# Patient Record
Sex: MALE | URBAN - METROPOLITAN AREA
[De-identification: names, ages, dates, MRNs, and addresses within clinical notes are randomized per-mention and may not be internally consistent; named-entity substitution may affect disease eponyms.]

---

## 2020-06-01 PROBLEM — Z00.00 ENCOUNTER FOR PREVENTIVE HEALTH EXAMINATION: Status: ACTIVE | Noted: 2020-06-01

## 2020-06-02 ENCOUNTER — OUTPATIENT (OUTPATIENT)
Dept: OUTPATIENT SERVICES | Facility: HOSPITAL | Age: 80
LOS: 1 days | End: 2020-06-02
Payer: MEDICAID

## 2020-06-02 ENCOUNTER — APPOINTMENT (OUTPATIENT)
Dept: PULMONOLOGY | Facility: CLINIC | Age: 80
End: 2020-06-02
Payer: MEDICAID

## 2020-06-02 ENCOUNTER — APPOINTMENT (OUTPATIENT)
Dept: RADIOLOGY | Facility: CLINIC | Age: 80
End: 2020-06-02

## 2020-06-02 VITALS
OXYGEN SATURATION: 90 % | TEMPERATURE: 98.4 F | HEART RATE: 85 BPM | WEIGHT: 115 LBS | DIASTOLIC BLOOD PRESSURE: 79 MMHG | SYSTOLIC BLOOD PRESSURE: 159 MMHG

## 2020-06-02 DIAGNOSIS — R09.02 HYPOXEMIA: ICD-10-CM

## 2020-06-02 DIAGNOSIS — N40.0 BENIGN PROSTATIC HYPERPLASIA WITHOUT LOWER URINARY TRACT SYMPMS: ICD-10-CM

## 2020-06-02 PROCEDURE — 99204 OFFICE O/P NEW MOD 45 MIN: CPT

## 2020-06-02 PROCEDURE — 71046 X-RAY EXAM CHEST 2 VIEWS: CPT | Mod: 26

## 2020-06-02 RX ORDER — ALBUTEROL SULFATE 90 UG/1
INHALANT RESPIRATORY (INHALATION)
Refills: 0 | Status: ACTIVE | COMMUNITY

## 2020-06-02 NOTE — PHYSICAL EXAM
[No Acute Distress] : no acute distress [Normal Appearance] : normal appearance [Normal Oropharynx] : normal oropharynx [Normal Rate/Rhythm] : normal rate/rhythm [No Neck Mass] : no neck mass [Normal S1, S2] : normal s1, s2 [No Murmurs] : no murmurs [No Abnormalities] : no abnormalities [Benign] : benign [Normal Gait] : normal gait [No Clubbing] : no clubbing [No Cyanosis] : no cyanosis [No Edema] : no edema [FROM] : FROM [No Focal Deficits] : no focal deficits [Normal Color/ Pigmentation] : normal color/ pigmentation [Oriented x3] : oriented x3 [Normal Affect] : normal affect [TextBox_68] : decreased breath sounds bilaterally; somewhat winded when speaking full sentences but not in distress

## 2020-06-02 NOTE — ASSESSMENT
[FreeTextEntry1] : 80yo man with hx of HTN here for evaluation of SOB/JORGE. His symptoms seem acute over the past 3 months and he has been hospitalized 3 times and the etiology of symptoms is unclear; he denies ever being given a diagnosis for his respiratory complaints, and denies any past history of lung disease. Records that he brought in are not useful. He has ambulatory hypoxemia: desaturated to 87% without oxygen; at rest he was above 94%. At this point I need more information as he definitely has some sort of respiratory issue. I have asked him to get a CXR. Also ordered blood-work, including COVID19 IgG serology. Will also request records from both [place] and [facility] for more information. Depending on serology and CXR results will most likely need further workup. I cannot clear him for travel back to [facility] because I do not know the etiology of his hypoxemia.

## 2020-06-02 NOTE — HISTORY OF PRESENT ILLNESS
[Never] : never [Intermittent] : Intermittent [NC] : Nasal Cannula [TextBox_4] : 78 yo man with HTN, BPH here for post hospitalization evaluation. He is Turkish speaking.  services used. \par \par Over the past 3 months he has noticed an increase in SOB/JORGE, which has led to 3 different hospitalizations. He is not clear what he was hospitalized for; denies any kind of imaging performed. He is in the office with an oxygen tank. He endorses SOB and JORGE on exertion which are improved when he is at rest. Denies any other symptoms including cough, fever, myalgias. He would like to fly back to [facility] and would like clearance. He brought paperwork with him that lists his discharge medication list and mention of possible COVID infection but no clear evidence that he was hospitalized for this. He denies having ever been told he had COVID infection. He also denies ever being told what the etiology of his lung issues are. He is a never smoker. Denies: fever, cough, rash, LE edema. Did note that he was exposed to some "dirty" water but could not explain that further; denied diarrhea or other GI symptoms. \par \par \par [facility], [place], 4/28 to 5/9, MRN [id number]\par [facility], 5/17 to 5/27, MRN [id number] [FreeTextEntry1] : 2 [FreeTextEntry3] : during ambulation

## 2020-06-04 LAB
ALBUMIN SERPL ELPH-MCNC: 4.1 G/DL
ALP BLD-CCNC: 79 U/L
ALT SERPL-CCNC: 20 U/L
ANION GAP SERPL CALC-SCNC: 14 MMOL/L
AST SERPL-CCNC: 20 U/L
BASOPHILS # BLD AUTO: 0.03 K/UL
BASOPHILS NFR BLD AUTO: 0.3 %
BILIRUB SERPL-MCNC: 0.3 MG/DL
BUN SERPL-MCNC: 13 MG/DL
CALCIUM SERPL-MCNC: 9.7 MG/DL
CHLORIDE SERPL-SCNC: 104 MMOL/L
CO2 SERPL-SCNC: 24 MMOL/L
CREAT SERPL-MCNC: 0.78 MG/DL
CRP SERPL-MCNC: 1.05 MG/DL
DEPRECATED D DIMER PPP IA-ACNC: 398 NG/ML DDU
EOSINOPHIL # BLD AUTO: 0.23 K/UL
EOSINOPHIL NFR BLD AUTO: 2.7 %
FERRITIN SERPL-MCNC: 347 NG/ML
GLUCOSE SERPL-MCNC: 100 MG/DL
HCT VFR BLD CALC: 38.6 %
HGB BLD-MCNC: 11.8 G/DL
IMM GRANULOCYTES NFR BLD AUTO: 0.6 %
LYMPHOCYTES # BLD AUTO: 1.44 K/UL
LYMPHOCYTES NFR BLD AUTO: 16.8 %
MAN DIFF?: NORMAL
MCHC RBC-ENTMCNC: 30.6 GM/DL
MCHC RBC-ENTMCNC: 30.6 PG
MCV RBC AUTO: 100 FL
MONOCYTES # BLD AUTO: 0.44 K/UL
MONOCYTES NFR BLD AUTO: 5.1 %
NEUTROPHILS # BLD AUTO: 6.4 K/UL
NEUTROPHILS NFR BLD AUTO: 74.5 %
PLATELET # BLD AUTO: 270 K/UL
POTASSIUM SERPL-SCNC: 4.7 MMOL/L
PROCALCITONIN SERPL-MCNC: 0.03 NG/ML
PROT SERPL-MCNC: 7.8 G/DL
RBC # BLD: 3.86 M/UL
RBC # FLD: 14.8 %
SARS-COV-2 IGG SERPL IA-ACNC: 7.55 INDEX
SARS-COV-2 IGG SERPL QL IA: POSITIVE
SODIUM SERPL-SCNC: 142 MMOL/L
WBC # FLD AUTO: 8.59 K/UL

## 2020-06-11 ENCOUNTER — APPOINTMENT (OUTPATIENT)
Dept: CT IMAGING | Facility: CLINIC | Age: 80
End: 2020-06-11
Payer: MEDICAID

## 2020-06-11 ENCOUNTER — OUTPATIENT (OUTPATIENT)
Dept: OUTPATIENT SERVICES | Facility: HOSPITAL | Age: 80
LOS: 1 days | End: 2020-06-11

## 2020-06-11 PROCEDURE — 71250 CT THORAX DX C-: CPT | Mod: 26

## 2020-06-12 ENCOUNTER — APPOINTMENT (OUTPATIENT)
Dept: PULMONOLOGY | Facility: CLINIC | Age: 80
End: 2020-06-12
Payer: MEDICAID

## 2020-06-12 PROCEDURE — 99214 OFFICE O/P EST MOD 30 MIN: CPT | Mod: 95

## 2020-06-12 NOTE — HISTORY OF PRESENT ILLNESS
[Medical Office: ([facility])___] : at the medical office located in  [Home] : at home, [unfilled] , at the time of the visit. [Spouse] : spouse [Verbal consent obtained from patient] : the patient, [unfilled] [TextBox_4] : 78 yo man with HTN, BPH here for post hospitalization evaluation. He is Albanian speaking.  services used. \par \par Over the past 3 months he has noticed an increase in SOB/JORGE, which has led to 3 different hospitalizations. He is not clear what he was hospitalized for; denies any kind of imaging performed. He is in the office with an oxygen tank. He endorses SOB and JORGE on exertion which are improved when he is at rest. Denies any other symptoms including cough, fever, myalgias. He would like to fly back to [facility] and would like clearance. He brought paperwork with him that lists his discharge medication list and mention of possible COVID infection but no clear evidence that he was hospitalized for this. He denies having ever been told he had COVID infection. He also denies ever being told what the etiology of his lung issues are. He is a never smoker. Denies: fever, cough, rash, LE edema. Did note that he was exposed to some "dirty" water but could not explain that further; denied diarrhea or other GI symptoms. \par \par \par [facility], [place], 4/28 to 5/9, MRN [id number]\[facility], 5/17 to 5/27, MRN [id number]\[facility] 6/12/[facility]\par Patient is having the same SOB/JORGE. Had CT chest. Using O2 when ambulating. [Never] : never [Intermittent] : Intermittent [NC] : Nasal Cannula [FreeTextEntry1] : 2 [FreeTextEntry3] : during ambulation

## 2020-06-12 NOTE — PROCEDURE
[FreeTextEntry1] : CT chest from 6/11/2020 reviewed:\par *GGO bilaterally in upper and lower lobes\par *Traction bronchiectasis\par *Small pleural effusion on left\par *N7 node enlarged\par *Incidental finding: calcified aorta; outpouching in descending aorta

## 2020-06-12 NOTE — PHYSICAL EXAM
[Well Nourished] : well nourished [No Acute Distress] : no acute distress [Normal Appearance] : normal appearance [No Deformities] : no deformities [Well Developed] : well developed [TextBox_2] : *****full physical exam could not be performed due to this being a TeleHealth visit

## 2020-06-12 NOTE — ASSESSMENT
[FreeTextEntry1] : 80 yo man with HTN, BPH with COVID19 lung disease. He is about 3 months post initial diagnosis. He is oxygen dependent. His CT chest shows traction bronchiectasis and extensive GGOs; there is no prior CT chest available for review (or was ever done) to determine the progression of disease. While it may be too late in his disease progression I think it is worth to try Prednisone to see if there is improvement in symptoms/imaging. Prednisone, 40mg Qday, sent to pharmacy. Will be on prednisone for at least 4 weeks so will also start Bactrim for PCP ppx. Will need PFTs as well. Of note, there was an incidental finding of an outpouching of the descending aorta that is concerning and requires further imaging; CTA ordered for further evaluation. He was advised to have this testing done as quickly as possible, and auth has been expedited. Mr. Sarabia will follow up with me in 2 weeks or sooner if needed. All questions answered.\par \par

## 2020-06-18 ENCOUNTER — OUTPATIENT (OUTPATIENT)
Dept: OUTPATIENT SERVICES | Facility: HOSPITAL | Age: 80
LOS: 1 days | End: 2020-06-18

## 2020-06-18 ENCOUNTER — APPOINTMENT (OUTPATIENT)
Dept: CT IMAGING | Facility: CLINIC | Age: 80
End: 2020-06-18
Payer: MEDICAID

## 2020-06-18 PROCEDURE — 71275 CT ANGIOGRAPHY CHEST: CPT | Mod: 26

## 2020-06-28 PROBLEM — U07.1 COVID-19 VIRUS INFECTION: Status: ACTIVE | Noted: 2020-06-04

## 2020-06-28 PROBLEM — Z85.46 HISTORY OF MALIGNANT NEOPLASM OF PROSTATE: Status: RESOLVED | Noted: 2020-06-28 | Resolved: 2020-06-28

## 2020-06-28 PROBLEM — Q25.40 AORTIC ANOMALY: Noted: 2020-06-12

## 2020-06-28 PROBLEM — I70.0 AORTIC ATHEROSCLEROSIS: Status: ACTIVE | Noted: 2020-06-28

## 2020-06-29 ENCOUNTER — NON-APPOINTMENT (OUTPATIENT)
Age: 80
End: 2020-06-29

## 2020-06-29 ENCOUNTER — APPOINTMENT (OUTPATIENT)
Dept: HEART AND VASCULAR | Facility: CLINIC | Age: 80
End: 2020-06-29
Payer: MEDICAID

## 2020-06-29 VITALS — SYSTOLIC BLOOD PRESSURE: 153 MMHG | DIASTOLIC BLOOD PRESSURE: 76 MMHG

## 2020-06-29 VITALS
HEIGHT: 61 IN | DIASTOLIC BLOOD PRESSURE: 76 MMHG | HEART RATE: 77 BPM | WEIGHT: 121 LBS | OXYGEN SATURATION: 96 % | TEMPERATURE: 97.4 F | SYSTOLIC BLOOD PRESSURE: 159 MMHG | BODY MASS INDEX: 22.84 KG/M2

## 2020-06-29 DIAGNOSIS — Z85.46 PERSONAL HISTORY OF MALIGNANT NEOPLASM OF PROSTATE: ICD-10-CM

## 2020-06-29 DIAGNOSIS — U07.1 COVID-19: ICD-10-CM

## 2020-06-29 DIAGNOSIS — I10 ESSENTIAL (PRIMARY) HYPERTENSION: ICD-10-CM

## 2020-06-29 DIAGNOSIS — I70.0 ATHEROSCLEROSIS OF AORTA: ICD-10-CM

## 2020-06-29 DIAGNOSIS — Q25.40 CONGENITAL MALFORMATION OF AORTA UNSPECIFIED: ICD-10-CM

## 2020-06-29 PROCEDURE — 99205 OFFICE O/P NEW HI 60 MIN: CPT | Mod: 25

## 2020-06-29 PROCEDURE — 93000 ELECTROCARDIOGRAM COMPLETE: CPT

## 2020-06-29 RX ORDER — TRAZODONE HYDROCHLORIDE 100 MG/1
100 TABLET ORAL
Qty: 30 | Refills: 5 | Status: ACTIVE | COMMUNITY

## 2020-06-29 RX ORDER — LABETALOL HYDROCHLORIDE 100 MG/1
100 TABLET, FILM COATED ORAL
Qty: 180 | Refills: 3 | Status: ACTIVE | COMMUNITY
Start: 2020-06-29 | End: 1900-01-01

## 2020-06-29 RX ORDER — LOSARTAN POTASSIUM 100 MG/1
100 TABLET, FILM COATED ORAL DAILY
Qty: 90 | Refills: 3 | Status: ACTIVE | COMMUNITY

## 2020-06-29 NOTE — HISTORY OF PRESENT ILLNESS
[FreeTextEntry1] : Mr. Sarabia presents for initial evaluation and management of HTN, aortic atherosclerosis, bronchiectasis, COVID-19, prostate cancer s/p XRT, and BPH.  He was diagnosed with COVID-19 in May, 2020 and has had slowly improving JORGE as a result.  He presents secondary to a CTA chest performed on 06/22/20 which revealed atherosclerosis descending aorta, no aortic aneurysm, and extensive ground glass opacities secondary to COVID-19. He lives in [facility] and will be traveling home in July, 2020. He denies chest pain or palpitations and has good exercise capacity. \par \par Upper sorbian  : [id number]

## 2020-06-29 NOTE — REASON FOR VISIT
[Initial Evaluation] : an initial evaluation of [FreeTextEntry1] : HTN, aortic atherosclerosis, bronchiectasis, COVID-19, prostate cancer s/p XRT, and BPH

## 2020-06-29 NOTE — PHYSICAL EXAM
[General Appearance - Well Developed] : well developed [Well Groomed] : well groomed [Normal Appearance] : normal appearance [General Appearance - Well Nourished] : well nourished [General Appearance - In No Acute Distress] : no acute distress [No Deformities] : no deformities [Normal Oral Mucosa] : normal oral mucosa [Normal Conjunctiva] : the conjunctiva exhibited no abnormalities [Eyelids - No Xanthelasma] : the eyelids demonstrated no xanthelasmas [No Oral Cyanosis] : no oral cyanosis [Normal Jugular Venous A Waves Present] : normal jugular venous A waves present [No Oral Pallor] : no oral pallor [No Jugular Venous Jasso A Waves] : no jugular venous jasso A waves [Normal Jugular Venous V Waves Present] : normal jugular venous V waves present [Normal S2] : normal S2 [Normal Rate] : normal [Normal S1] : normal S1 [2+] : left 2+ [No Abnormalities] : the abdominal aorta was not enlarged and no bruit was heard [No Pitting Edema] : no pitting edema present [Respiration, Rhythm And Depth] : normal respiratory rhythm and effort [Auscultation Breath Sounds / Voice Sounds] : lungs were clear to auscultation bilaterally [Exaggerated Use Of Accessory Muscles For Inspiration] : no accessory muscle use [Abdomen Soft] : soft [Abdomen Tenderness] : non-tender [Abdomen Mass (___ Cm)] : no abdominal mass palpated [Nail Clubbing] : no clubbing of the fingernails [Abnormal Walk] : normal gait [Gait - Sufficient For Exercise Testing] : the gait was sufficient for exercise testing [Cyanosis, Localized] : no localized cyanosis [Skin Color & Pigmentation] : normal skin color and pigmentation [Petechial Hemorrhages (___cm)] : no petechial hemorrhages [] : no rash [Skin Lesions] : no skin lesions [No Venous Stasis] : no venous stasis [Oriented To Time, Place, And Person] : oriented to person, place, and time [No Skin Ulcers] : no skin ulcer [No Xanthoma] : no  xanthoma was observed [Mood] : the mood was normal [Affect] : the affect was normal [No Anxiety] : not feeling anxious [Holosystolic] : holosystolic [Medium] : medium pitched [II] : a grade 2 [Harsh] : harsh [S3] : no S3 [S4] : no S4 [Right Carotid Bruit] : no bruit heard over the right carotid [Left Carotid Bruit] : no bruit heard over the left carotid [Right Femoral Bruit] : no bruit heard over the right femoral artery [Left Femoral Bruit] : no bruit heard over the left femoral artery

## 2020-06-29 NOTE — REVIEW OF SYSTEMS
[Negative] : Psychiatric [Dyspnea on exertion] : dyspnea during exertion [Shortness Of Breath] : shortness of breath [Chest Pain] : no chest pain

## 2020-06-29 NOTE — ASSESSMENT
[FreeTextEntry1] : 1. HTN: BP not at ACC/AHA 2017 guideline target\par        - continue losartan 100mg po QD\par        - will add labetalol 100mg po bid (possible adverse effects of new medications discussed) (will screen for bradycardia)\par        - if BP remains above target next visit will up titrate anti-HTN regimen\par        - will send for an echocardiogram to r/o hypertensive heart disease\par \par 2. Aortic atherosclerosis: 06/22/20: CTA chest: atherosclerosis descending aorta, no aortic aneurysm, extensive ground glass opacities secondary to COVID-19. \par         - continue aggressive medical management\par         - will follow with serial imaging\par         - will send for an aorta-iliac sonogram to assess abdominal aorta and mesenteric vessels\par         - will initiate rosuvastatin 20mg po QD (possible adverse effects of new medications discussed)\par \par 3. COVID-19 infection: 05/2020\par          - no signs of active infection\par          - continue conservative management\par \par [facility]. [facility] [place] [facility]par          - there are no cardiac contraindications to air travel at this time\par \par - patient will provide copy of recent lab work from PMD's office for my review.\par \par

## 2020-06-30 ENCOUNTER — APPOINTMENT (OUTPATIENT)
Dept: PULMONOLOGY | Facility: CLINIC | Age: 80
End: 2020-06-30
Payer: MEDICAID

## 2020-06-30 PROCEDURE — 99213 OFFICE O/P EST LOW 20 MIN: CPT | Mod: 95

## 2020-07-01 NOTE — HISTORY OF PRESENT ILLNESS
[Home] : at home, [unfilled] , at the time of the visit. [Medical Office: ([facility])___] : at the medical office located in  [Spouse] : spouse [Verbal consent obtained from patient] : the patient, [unfilled] [Never] : never [NC] : Nasal Cannula [Intermittent] : Intermittent [FreeTextEntry1] : 2 [TextBox_4] : 78 yo man with HTN, BPH here for post hospitalization evaluation. He is Upper sorbian speaking.  services used. \par \par Over the past 3 months he has noticed an increase in SOB/JORGE, which has led to 3 different hospitalizations. He is not clear what he was hospitalized for; denies any kind of imaging performed. He is in the office with an oxygen tank. He endorses SOB and JORGE on exertion which are improved when he is at rest. Denies any other symptoms including cough, fever, myalgias. He would like to fly back to [facility] and would like clearance. He brought paperwork with him that lists his discharge medication list and mention of possible COVID infection but no clear evidence that he was hospitalized for this. He denies having ever been told he had COVID infection. He also denies ever being told what the etiology of his lung issues are. He is a never smoker. Denies: fever, cough, rash, LE edema. Did note that he was exposed to some "dirty" water but could not explain that further; denied diarrhea or other GI symptoms. \par \par \par [facility], 4/28 to 5/9, MRN [id number]\par [facility] [place], 5/17 to 5/27, MRN [id number]\par \par 6/12/2020\par Patient is having the same SOB/JORGE. Had CT chest. Using O2 when ambulating.\par \par 7/1/2020\par Doing well on steroids. Stopped using oxygen for ambulation, as he has been checking his levels and they are consistently above 90. Would like to travel to [facility] in July. [FreeTextEntry3] : during ambulation

## 2020-07-01 NOTE — ASSESSMENT
[FreeTextEntry1] : 80 yo man with HTN, BPH with COVID19 lung disease. His CT chest shows traction bronchiectasis and extensive GGOs; there is no prior CT chest available for review (or was ever done) to determine the progression of disease. Has been on prednisone (+ PCP ppx) since June 12, 2020 and has noticed a significant improvement in oxygenation as well as functional ability. Has stopped O2 use since starting Prednisone. We will continue with 40mg of Prednisone for a total of 4 weeks (with PCP ppx) and we will start to taper by 5mg each week to monitor his response. Will need repeat imaging at 6 to 8 weeks (end of July/beginning of August). Mr. Sarabia would like to move back to [facility]; while I cannot stop him I have advised him he will need to set up adequate follow up care there as we do not know the duration of steroids he will need. Mr. Sarabia has verbalized understanding regarding this and will discuss this further with his family. If he decides to leave I would like to repeat imaging sooner. Follow up in 2 weeks.

## 2020-07-06 RX ORDER — ROSUVASTATIN CALCIUM 20 MG/1
20 TABLET, FILM COATED ORAL DAILY
Qty: 90 | Refills: 3 | Status: ACTIVE | COMMUNITY
Start: 2020-06-29

## 2020-07-13 ENCOUNTER — LABORATORY RESULT (OUTPATIENT)
Age: 80
End: 2020-07-13

## 2020-07-15 ENCOUNTER — APPOINTMENT (OUTPATIENT)
Dept: HEART AND VASCULAR | Facility: CLINIC | Age: 80
End: 2020-07-15
Payer: MEDICAID

## 2020-07-15 ENCOUNTER — APPOINTMENT (OUTPATIENT)
Dept: PULMONOLOGY | Facility: CLINIC | Age: 80
End: 2020-07-15
Payer: MEDICAID

## 2020-07-15 VITALS — TEMPERATURE: 97.8 F

## 2020-07-15 PROCEDURE — 93306 TTE W/DOPPLER COMPLETE: CPT

## 2020-07-15 PROCEDURE — 94729 DIFFUSING CAPACITY: CPT

## 2020-07-15 PROCEDURE — 94726 PLETHYSMOGRAPHY LUNG VOLUMES: CPT

## 2020-07-15 PROCEDURE — 94618 PULMONARY STRESS TESTING: CPT

## 2020-07-15 PROCEDURE — ZZZZZ: CPT

## 2020-07-15 PROCEDURE — 93978 VASCULAR STUDY: CPT

## 2020-07-15 PROCEDURE — 94010 BREATHING CAPACITY TEST: CPT

## 2020-07-15 NOTE — HISTORY OF PRESENT ILLNESS
[FreeTextEntry1] : Mr. Sarabia presents for initial evaluation and management of HTN, aortic atherosclerosis, bronchiectasis, COVID-19, prostate cancer s/p XRT, and BPH.  He was diagnosed with COVID-19 in May, 2020 and has had slowly improving JORGE as a result.  He presents secondary to a CTA chest performed on 06/22/20 which revealed atherosclerosis descending aorta, no aortic aneurysm, and extensive ground glass opacities secondary to COVID-19. He lives in [facility] and will be traveling home in July, 2020. He denies chest pain or palpitations and has good exercise capacity. \par \par Korean  : [id number]

## 2020-07-15 NOTE — ASSESSMENT
[FreeTextEntry1] : 1. HTN: BP not at ACC/AHA 2017 guideline target\par        - continue losartan 100mg po QD\par        - will add labetalol 100mg po bid (possible adverse effects of new medications discussed) (will screen for bradycardia)\par        - if BP remains above target next visit will up titrate anti-HTN regimen\par        - will send for an echocardiogram to r/o hypertensive heart disease\par \par 2. Aortic atherosclerosis: 06/22/20: CTA chest: atherosclerosis descending aorta, no aortic aneurysm, extensive ground glass opacities secondary to COVID-19. \par         - continue aggressive medical management\par         - will follow with serial imaging\par         - will send for an aorta-iliac sonogram to assess abdominal aorta and mesenteric vessels\par         - will initiate rosuvastatin 20mg po QD (possible adverse effects of new medications discussed)\par \par 3. COVID-19 infection: 05/2020\par          - no signs of active infection\par          - continue conservative management\par \par 4. [facility]par          - there are no cardiac contraindications to air travel at this time\par \par - patient will provide copy of recent lab work from PMD's office for my review.\par \par

## 2020-07-15 NOTE — REASON FOR VISIT
[Initial Evaluation] : an initial evaluation of [FreeTextEntry1] : Diagnostic Tests:\par ------------------------------------\par ECG:\par 06/29/20: NSR, normal ECG. \par ------------------------------------\par CT:\par 06/22/20: CTA chest: atherosclerosis descending aorta, no aortic aneurysm, extensive ground glass opacities secondary to COVID-19. \par 06/11/20: chest: extensive b/l ground glass opacities secondary to COVID-19, possible sacular aortic aneurysm, mediastinal lymphadenopathy. \par -------------------------------------\par Echo:\par 07/15/20: EF 63%, normal study.\par -------------------------------------\par Aorta-Iliac:\par 07/15/20: sono: no AAA, mild diffuse atherosclerosis.

## 2020-07-15 NOTE — REVIEW OF SYSTEMS
[Dyspnea on exertion] : dyspnea during exertion [Shortness Of Breath] : shortness of breath [Chest Pain] : no chest pain [Negative] : Endocrine

## 2020-07-15 NOTE — PHYSICAL EXAM
[Normal Appearance] : normal appearance [General Appearance - Well Developed] : well developed [General Appearance - Well Nourished] : well nourished [Well Groomed] : well groomed [No Deformities] : no deformities [Normal Conjunctiva] : the conjunctiva exhibited no abnormalities [General Appearance - In No Acute Distress] : no acute distress [Normal Oral Mucosa] : normal oral mucosa [Eyelids - No Xanthelasma] : the eyelids demonstrated no xanthelasmas [No Oral Cyanosis] : no oral cyanosis [Normal Jugular Venous A Waves Present] : normal jugular venous A waves present [No Oral Pallor] : no oral pallor [No Jugular Venous Jasso A Waves] : no jugular venous jasso A waves [Normal Jugular Venous V Waves Present] : normal jugular venous V waves present [Exaggerated Use Of Accessory Muscles For Inspiration] : no accessory muscle use [Respiration, Rhythm And Depth] : normal respiratory rhythm and effort [Auscultation Breath Sounds / Voice Sounds] : lungs were clear to auscultation bilaterally [Abdomen Soft] : soft [Abdomen Tenderness] : non-tender [Abnormal Walk] : normal gait [Abdomen Mass (___ Cm)] : no abdominal mass palpated [Nail Clubbing] : no clubbing of the fingernails [Gait - Sufficient For Exercise Testing] : the gait was sufficient for exercise testing [Cyanosis, Localized] : no localized cyanosis [Skin Color & Pigmentation] : normal skin color and pigmentation [] : no ischemic changes [Petechial Hemorrhages (___cm)] : no petechial hemorrhages [No Venous Stasis] : no venous stasis [Skin Lesions] : no skin lesions [No Xanthoma] : no  xanthoma was observed [Oriented To Time, Place, And Person] : oriented to person, place, and time [No Skin Ulcers] : no skin ulcer [No Anxiety] : not feeling anxious [Mood] : the mood was normal [Affect] : the affect was normal [Normal S1] : normal S1 [Normal Rate] : normal [Normal S2] : normal S2 [S3] : no S3 [S4] : no S4 [Medium] : medium pitched [Holosystolic] : holosystolic [II] : a grade 2 [Harsh] : harsh [Right Carotid Bruit] : no bruit heard over the right carotid [Left Carotid Bruit] : no bruit heard over the left carotid [Right Femoral Bruit] : no bruit heard over the right femoral artery [Left Femoral Bruit] : no bruit heard over the left femoral artery [No Abnormalities] : the abdominal aorta was not enlarged and no bruit was heard [2+] : left 2+ [No Pitting Edema] : no pitting edema present

## 2020-07-16 ENCOUNTER — APPOINTMENT (OUTPATIENT)
Dept: HEART AND VASCULAR | Facility: CLINIC | Age: 80
End: 2020-07-16

## 2020-07-20 ENCOUNTER — APPOINTMENT (OUTPATIENT)
Dept: CT IMAGING | Facility: CLINIC | Age: 80
End: 2020-07-20
Payer: MEDICAID

## 2020-07-20 ENCOUNTER — APPOINTMENT (OUTPATIENT)
Dept: PULMONOLOGY | Facility: CLINIC | Age: 80
End: 2020-07-20

## 2020-07-20 ENCOUNTER — OUTPATIENT (OUTPATIENT)
Dept: OUTPATIENT SERVICES | Facility: HOSPITAL | Age: 80
LOS: 1 days | End: 2020-07-20

## 2020-07-20 ENCOUNTER — RESULT REVIEW (OUTPATIENT)
Age: 80
End: 2020-07-20

## 2020-07-20 PROCEDURE — 71250 CT THORAX DX C-: CPT | Mod: 26

## 2020-07-21 ENCOUNTER — APPOINTMENT (OUTPATIENT)
Dept: PULMONOLOGY | Facility: CLINIC | Age: 80
End: 2020-07-21
Payer: MEDICAID

## 2020-07-21 PROCEDURE — 99443: CPT

## 2020-07-21 RX ORDER — SULFAMETHOXAZOLE AND TRIMETHOPRIM 800; 160 MG/1; MG/1
800-160 TABLET ORAL
Qty: 2 | Refills: 0 | Status: ACTIVE | COMMUNITY
Start: 2020-06-12 | End: 1900-01-01

## 2020-07-21 RX ORDER — PREDNISONE 10 MG/1
10 TABLET ORAL
Qty: 200 | Refills: 0 | Status: ACTIVE | COMMUNITY
Start: 2020-06-12 | End: 1900-01-01

## 2020-07-22 NOTE — ASSESSMENT
[FreeTextEntry1] : 80 yo man with HTN, BPH with post COVID19 related lung disease. Has been on Prednisone for about 1 month and is off oxygen and is feeling well. Pulmonary complaints are minimal; able to walk at capacity. He had a repeat CT chest on 7/20/2020 which shows near complete resolution of bilateral GGOs, with some residual traction as compared to prior imaging. PFTs shows mild reduction in DLCO and normal spirometry. Six minute walk test did not show any desaturation (as compared to prior when he was hypoxemic with exertion without O2). He is leaving for [facility] on 7/22/2020 and will be living there permanently. I have suggested a 12 week taper of prednisone: 30mg x 4 weeks, 20mg x 4 weeks, 10mg x 4 weeks, 5mg x 4 weeks, and then stop. Will continue Bactrim until steroid taper is complete. Advised him to establish care with a pulmonologist in [facility].

## 2020-07-22 NOTE — PROCEDURE
[FreeTextEntry1] : PFTs from 7/15/2020 reviewed:\par *FEV1 116\par *FEV1/FVC 85\par *TLC 94\par *ERV 10\par *DLCO 64\par mild reduction in DLCO; normal spirometry\par \par 6MWT from 7/15/2020 reviewed: \par pre/post HR 67/116\par pre/post saturation 97/95% \par pre/post Nadeen Dyspnea 3/6\par pre/post Nadeen Fatigue 3/6\par meters walked 1400

## 2020-07-22 NOTE — HISTORY OF PRESENT ILLNESS
[Medical Office: ([facility])___] : at the medical office located in  [Home] : at home, [unfilled] , at the time of the visit. [Verbal consent obtained from patient] : the patient, [unfilled] [Never] : never [Intermittent] : Intermittent [NC] : Nasal Cannula [TextBox_4] : 80 yo man with HTN, BPH here for post hospitalization evaluation. He is Amharic speaking.  services used. \par \par Over the past 3 months he has noticed an increase in SOB/JORGE, which has led to 3 different hospitalizations. He is not clear what he was hospitalized for; denies any kind of imaging performed. He is in the office with an oxygen tank. He endorses SOB and JORGE on exertion which are improved when he is at rest. Denies any other symptoms including cough, fever, myalgias. He would like to fly back to [facility] and would like clearance. He brought paperwork with him that lists his discharge medication list and mention of possible COVID infection but no clear evidence that he was hospitalized for this. He denies having ever been told he had COVID infection. He also denies ever being told what the etiology of his lung issues are. He is a never smoker. Denies: fever, cough, rash, LE edema. Did note that he was exposed to some "dirty" water but could not explain that further; denied diarrhea or other GI symptoms. \par \par \par [facility], [facility], MRN [id number]\par [facility], 5/17 to 5/27, MRN [id number]\[facility] \[facility] 6/12/2020\par Patient is having the same SOB/JORGE. Had CT chest. Using O2 when ambulating.\par \par 7/1/2020\par Doing well on steroids. Stopped using oxygen for ambulation, as he has been checking his levels and they are consistently above 90. Would like to travel to [facility] in July.\par \par 7/21/2020\par Decided to move back to [facility]; leaving on 7/22/2020. Had a CT chest, PFTs. Is doing well; pulmonary issues have pretty much resolved. Does endorse some finger tightness. [FreeTextEntry1] : 2 [FreeTextEntry3] : during ambulation

## 2020-09-13 ENCOUNTER — RX RENEWAL (OUTPATIENT)
Age: 80
End: 2020-09-13

## 2020-12-15 ENCOUNTER — RX RENEWAL (OUTPATIENT)
Age: 80
End: 2020-12-15

## 2021-01-04 ENCOUNTER — RX RENEWAL (OUTPATIENT)
Age: 81
End: 2021-01-04

## 2021-10-06 PROBLEM — I10 ESSENTIAL HYPERTENSION: Status: ACTIVE | Noted: 2020-06-02
